# Patient Record
Sex: MALE | Race: WHITE | NOT HISPANIC OR LATINO | Employment: FULL TIME | URBAN - METROPOLITAN AREA
[De-identification: names, ages, dates, MRNs, and addresses within clinical notes are randomized per-mention and may not be internally consistent; named-entity substitution may affect disease eponyms.]

---

## 2022-06-02 ENCOUNTER — HOSPITAL ENCOUNTER (OUTPATIENT)
Facility: CLINIC | Age: 52
Setting detail: OBSERVATION
Discharge: HOME | End: 2022-06-03
Attending: EMERGENCY MEDICINE | Admitting: INTERNAL MEDICINE
Payer: COMMERCIAL

## 2022-06-02 ENCOUNTER — APPOINTMENT (OUTPATIENT)
Dept: GENERAL RADIOLOGY | Facility: CLINIC | Age: 52
End: 2022-06-02
Attending: EMERGENCY MEDICINE
Payer: COMMERCIAL

## 2022-06-02 ENCOUNTER — APPOINTMENT (OUTPATIENT)
Dept: CARDIOLOGY | Facility: CLINIC | Age: 52
End: 2022-06-02
Attending: INTERNAL MEDICINE
Payer: COMMERCIAL

## 2022-06-02 DIAGNOSIS — R94.31 ABNORMAL ELECTROCARDIOGRAM: ICD-10-CM

## 2022-06-02 DIAGNOSIS — J30.1 SEASONAL ALLERGIC RHINITIS DUE TO POLLEN: ICD-10-CM

## 2022-06-02 DIAGNOSIS — R79.89 ELEVATED TROPONIN: ICD-10-CM

## 2022-06-02 DIAGNOSIS — G44.209 TENSION HEADACHE: Primary | ICD-10-CM

## 2022-06-02 DIAGNOSIS — I10 HYPERTENSION, UNSPECIFIED TYPE: ICD-10-CM

## 2022-06-02 LAB
ANION GAP SERPL CALCULATED.3IONS-SCNC: 8 MMOL/L (ref 3–14)
ATRIAL RATE - MUSE: 62 BPM
BASOPHILS # BLD AUTO: 0.1 10E3/UL (ref 0–0.2)
BASOPHILS NFR BLD AUTO: 1 %
BUN SERPL-MCNC: 16 MG/DL (ref 7–30)
CALCIUM SERPL-MCNC: 9.2 MG/DL (ref 8.5–10.1)
CHLORIDE BLD-SCNC: 107 MMOL/L (ref 94–109)
CO2 SERPL-SCNC: 25 MMOL/L (ref 20–32)
CREAT SERPL-MCNC: 1.02 MG/DL (ref 0.66–1.25)
DIASTOLIC BLOOD PRESSURE - MUSE: NORMAL MMHG
EOSINOPHIL # BLD AUTO: 0.1 10E3/UL (ref 0–0.7)
EOSINOPHIL NFR BLD AUTO: 1 %
ERYTHROCYTE [DISTWIDTH] IN BLOOD BY AUTOMATED COUNT: 12.4 % (ref 10–15)
FLUAV RNA SPEC QL NAA+PROBE: NEGATIVE
FLUBV RNA RESP QL NAA+PROBE: NEGATIVE
GFR SERPL CREATININE-BSD FRML MDRD: 88 ML/MIN/1.73M2
GLUCOSE BLD-MCNC: 118 MG/DL (ref 70–99)
HCT VFR BLD AUTO: 45.3 % (ref 40–53)
HGB BLD-MCNC: 14.9 G/DL (ref 13.3–17.7)
HOLD SPECIMEN: NORMAL
IMM GRANULOCYTES # BLD: 0 10E3/UL
IMM GRANULOCYTES NFR BLD: 0 %
INTERPRETATION ECG - MUSE: NORMAL
LVEF ECHO: NORMAL
LYMPHOCYTES # BLD AUTO: 1.4 10E3/UL (ref 0.8–5.3)
LYMPHOCYTES NFR BLD AUTO: 15 %
MCH RBC QN AUTO: 29.4 PG (ref 26.5–33)
MCHC RBC AUTO-ENTMCNC: 32.9 G/DL (ref 31.5–36.5)
MCV RBC AUTO: 89 FL (ref 78–100)
MONOCYTES # BLD AUTO: 1.1 10E3/UL (ref 0–1.3)
MONOCYTES NFR BLD AUTO: 11 %
NEUTROPHILS # BLD AUTO: 6.6 10E3/UL (ref 1.6–8.3)
NEUTROPHILS NFR BLD AUTO: 72 %
NRBC # BLD AUTO: 0 10E3/UL
NRBC BLD AUTO-RTO: 0 /100
P AXIS - MUSE: 5 DEGREES
PLATELET # BLD AUTO: 307 10E3/UL (ref 150–450)
POTASSIUM BLD-SCNC: 3.5 MMOL/L (ref 3.4–5.3)
PR INTERVAL - MUSE: 132 MS
QRS DURATION - MUSE: 106 MS
QT - MUSE: 446 MS
QTC - MUSE: 452 MS
R AXIS - MUSE: 10 DEGREES
RBC # BLD AUTO: 5.07 10E6/UL (ref 4.4–5.9)
RSV RNA SPEC NAA+PROBE: NEGATIVE
SARS-COV-2 RNA RESP QL NAA+PROBE: NEGATIVE
SODIUM SERPL-SCNC: 140 MMOL/L (ref 133–144)
SYSTOLIC BLOOD PRESSURE - MUSE: NORMAL MMHG
T AXIS - MUSE: 148 DEGREES
TROPONIN I SERPL HS-MCNC: 110 NG/L
TROPONIN I SERPL HS-MCNC: 91 NG/L
TROPONIN I SERPL HS-MCNC: 93 NG/L
VENTRICULAR RATE- MUSE: 62 BPM
WBC # BLD AUTO: 9.2 10E3/UL (ref 4–11)

## 2022-06-02 PROCEDURE — 71046 X-RAY EXAM CHEST 2 VIEWS: CPT

## 2022-06-02 PROCEDURE — 36415 COLL VENOUS BLD VENIPUNCTURE: CPT | Performed by: INTERNAL MEDICINE

## 2022-06-02 PROCEDURE — G0378 HOSPITAL OBSERVATION PER HR: HCPCS

## 2022-06-02 PROCEDURE — C9803 HOPD COVID-19 SPEC COLLECT: HCPCS

## 2022-06-02 PROCEDURE — 87637 SARSCOV2&INF A&B&RSV AMP PRB: CPT | Performed by: EMERGENCY MEDICINE

## 2022-06-02 PROCEDURE — 250N000011 HC RX IP 250 OP 636: Performed by: HOSPITALIST

## 2022-06-02 PROCEDURE — 84484 ASSAY OF TROPONIN QUANT: CPT | Mod: 91 | Performed by: INTERNAL MEDICINE

## 2022-06-02 PROCEDURE — 93306 TTE W/DOPPLER COMPLETE: CPT

## 2022-06-02 PROCEDURE — 93005 ELECTROCARDIOGRAM TRACING: CPT

## 2022-06-02 PROCEDURE — 250N000013 HC RX MED GY IP 250 OP 250 PS 637: Performed by: INTERNAL MEDICINE

## 2022-06-02 PROCEDURE — 99285 EMERGENCY DEPT VISIT HI MDM: CPT | Mod: 25

## 2022-06-02 PROCEDURE — 80048 BASIC METABOLIC PNL TOTAL CA: CPT | Performed by: EMERGENCY MEDICINE

## 2022-06-02 PROCEDURE — 84484 ASSAY OF TROPONIN QUANT: CPT | Performed by: EMERGENCY MEDICINE

## 2022-06-02 PROCEDURE — 99220 PR INITIAL OBSERVATION CARE,LEVEL III: CPT | Performed by: INTERNAL MEDICINE

## 2022-06-02 PROCEDURE — 36415 COLL VENOUS BLD VENIPUNCTURE: CPT | Performed by: EMERGENCY MEDICINE

## 2022-06-02 PROCEDURE — 85025 COMPLETE CBC W/AUTO DIFF WBC: CPT | Performed by: EMERGENCY MEDICINE

## 2022-06-02 PROCEDURE — 93306 TTE W/DOPPLER COMPLETE: CPT | Mod: 26 | Performed by: INTERNAL MEDICINE

## 2022-06-02 PROCEDURE — 93005 ELECTROCARDIOGRAM TRACING: CPT | Mod: 76

## 2022-06-02 PROCEDURE — 96374 THER/PROPH/DIAG INJ IV PUSH: CPT

## 2022-06-02 PROCEDURE — 250N000013 HC RX MED GY IP 250 OP 250 PS 637: Performed by: EMERGENCY MEDICINE

## 2022-06-02 RX ORDER — ONDANSETRON 4 MG/1
4 TABLET, ORALLY DISINTEGRATING ORAL EVERY 6 HOURS PRN
Status: DISCONTINUED | OUTPATIENT
Start: 2022-06-02 | End: 2022-06-03 | Stop reason: HOSPADM

## 2022-06-02 RX ORDER — NITROGLYCERIN 0.4 MG/1
0.4 TABLET SUBLINGUAL EVERY 5 MIN PRN
Status: DISCONTINUED | OUTPATIENT
Start: 2022-06-02 | End: 2022-06-02

## 2022-06-02 RX ORDER — FLUTICASONE PROPIONATE 50 MCG
1 SPRAY, SUSPENSION (ML) NASAL DAILY
Status: DISCONTINUED | OUTPATIENT
Start: 2022-06-02 | End: 2022-06-03 | Stop reason: HOSPADM

## 2022-06-02 RX ORDER — ACETAMINOPHEN 500 MG
1000 TABLET ORAL EVERY 4 HOURS PRN
Status: DISCONTINUED | OUTPATIENT
Start: 2022-06-02 | End: 2022-06-02

## 2022-06-02 RX ORDER — ACETAMINOPHEN 325 MG/1
650 TABLET ORAL EVERY 4 HOURS PRN
Status: DISCONTINUED | OUTPATIENT
Start: 2022-06-02 | End: 2022-06-03 | Stop reason: HOSPADM

## 2022-06-02 RX ORDER — ONDANSETRON 2 MG/ML
4 INJECTION INTRAMUSCULAR; INTRAVENOUS EVERY 6 HOURS PRN
Status: DISCONTINUED | OUTPATIENT
Start: 2022-06-02 | End: 2022-06-03 | Stop reason: HOSPADM

## 2022-06-02 RX ORDER — NITROGLYCERIN 0.4 MG/1
0.4 TABLET SUBLINGUAL EVERY 5 MIN PRN
Status: DISCONTINUED | OUTPATIENT
Start: 2022-06-02 | End: 2022-06-03 | Stop reason: HOSPADM

## 2022-06-02 RX ORDER — HYDRALAZINE HYDROCHLORIDE 20 MG/ML
10 INJECTION INTRAMUSCULAR; INTRAVENOUS EVERY 6 HOURS PRN
Status: DISCONTINUED | OUTPATIENT
Start: 2022-06-02 | End: 2022-06-03 | Stop reason: HOSPADM

## 2022-06-02 RX ORDER — ASPIRIN 325 MG
325 TABLET ORAL ONCE
Status: COMPLETED | OUTPATIENT
Start: 2022-06-02 | End: 2022-06-02

## 2022-06-02 RX ORDER — LISINOPRIL 20 MG/1
20 TABLET ORAL DAILY
Status: DISCONTINUED | OUTPATIENT
Start: 2022-06-02 | End: 2022-06-03 | Stop reason: HOSPADM

## 2022-06-02 RX ORDER — OXYCODONE HYDROCHLORIDE 5 MG/1
5 TABLET ORAL ONCE
Status: COMPLETED | OUTPATIENT
Start: 2022-06-02 | End: 2022-06-02

## 2022-06-02 RX ADMIN — NITROGLYCERIN 0.4 MG: 0.4 TABLET SUBLINGUAL at 09:45

## 2022-06-02 RX ADMIN — ACETAMINOPHEN 1000 MG: 500 TABLET, FILM COATED ORAL at 11:17

## 2022-06-02 RX ADMIN — ACETAMINOPHEN 650 MG: 325 TABLET ORAL at 20:54

## 2022-06-02 RX ADMIN — OXYCODONE HYDROCHLORIDE 5 MG: 5 TABLET ORAL at 15:04

## 2022-06-02 RX ADMIN — LISINOPRIL 20 MG: 20 TABLET ORAL at 11:17

## 2022-06-02 RX ADMIN — HYDRALAZINE HYDROCHLORIDE 10 MG: 20 INJECTION INTRAMUSCULAR; INTRAVENOUS at 22:51

## 2022-06-02 RX ADMIN — ASPIRIN 325 MG ORAL TABLET 325 MG: 325 PILL ORAL at 09:49

## 2022-06-02 ASSESSMENT — ENCOUNTER SYMPTOMS
COUGH: 1
SINUS PRESSURE: 1
SHORTNESS OF BREATH: 0
RHINORRHEA: 0
TROUBLE SWALLOWING: 0

## 2022-06-02 NOTE — H&P
[facility]  Hospitalist Admission Note  Name: Dimitrios Marshall    MRN: [id number]  YOB: 1970    Age: 52 year old  Date of admission: 6/2/2022  Primary care provider: Darin Bennett    Chief Complaint: Sinus pressure    Assessment and Plan:   Hypertensive urgency  Elevated troponin  Incomplete RBBB: Incidentally found to have blood pressure above 200 systolic at urgent care where he was being seen for possible sinus infection.  Here blood pressure was 201/132.  He reports in the past blood pressure has been 130s/90s and they have been watching it, but he has not followed up with primary care for at least 2 years and since then he has been gaining some weight and has not been as healthy.  Has been taking as needed medications that could be causing acute rise in blood pressure including Sudafed 2 days ago, intermittent ibuprofen, and Afrin yesterday and again this morning.  He has no cardiac history.  He was a cross-country runner in high school and he reports having a stress test done then due to some chest pain and he remembers having an RBBB.  His EKG shows an incomplete RBBB, borderline LVH, and T wave inversions in leads I, II, aVL, and V3-V6.  High-sensitivity troponin I was checked and is slightly elevated at 110.  He denies any chest pain or shortness of breath now or with exertion as he regularly wraps lacrosse games.  Lifelong non-smoker.  No family history of heart disease.  Highly suspect troponin elevation is secondary to hypertensive urgency and this does not represent NSTEMI or acute coronary syndrome.  He did receive an aspirin in the ER and 1 dose of nitroglycerin which resulted in headache.  -Serial troponin to make sure no substantial rise  -Obtain TTE  -Telemetry overnight  -Start lisinopril 20 mg daily.  Goal blood pressure while here 130-160 systolic.  If ineffective next add amlodipine or hydrochlorothiazide  -BMP tomorrow  -Likely outpatient stress  echocardiogram if troponin's are flat and no reduced EF or WMA on TTE  -Close 1 week follow-up with new primary care doctor with BMP.  He reports setting up an appointment this past week    Likely acute viral sinusitis versus seasonal allergic rhinitis: Having right frontal sinus pressure just above his right eyebrow along with sinus congestion, some rhinorrhea, and mild nonproductive cough for the last 4 days.  Not having any fevers and does not have leukocytosis here.  Likely sinusitis secondary acute viral URI versus possible seasonal allergic rhinitis as he was sending his son's deck outside so was exposed to both dust and pollen.  Has been taking Flonase, Afrin, and Sudafed and symptoms seem to be improving.  Influenza A/B, RSV, COVID-19 PCR negative.  -Flonase   -Acetaminophen as needed  -No Sudafed, ibuprofen, or Afrin due to propensity to elevated blood pressure    Obesity: BMI 33.    DVT Prophylaxis: Low Risk/Ambulatory with no VTE prophylaxis indicated  Code Status: Full Code  FEN: Regular diet  Discharge Dispo: Home  Estimated Disch Date / # of Days until Disch: Admit to observation for serial troponin, echocardiogram, and BP med initiation.  Anticipate discharge tomorrow if blood pressure controlled and no significant rise in troponin or reduced EF/WMA on echocardiogram with plan for outpatient stress echo      History of Present Illness:  Dimitrios Marshall is a 52 year old male with PMH including seasonal allergic rhinitis, obesity, and borderline elevated blood pressure in the past who presented initially to urgent care for concerns for possible sinus infection.  His blood pressure was over 200 systolic at urgent care so he was referred to the ER.  For the past 4 days he has had some pressure and pain just above his right eyebrow so he thought he might have a sinus infection.  He has had some rhinorrhea, postnasal drip, dry cough, and nasal congestion as well.  Denies any fevers or chills.  Because of  this he went to urgent care to see if he needed any antibiotics.  He denies any history of hypertension, but he has not seen his primary care doctor in over 2 years and they were following his blood pressure as he usually is 130s/90s in clinic.  Has not checked his blood pressure since then.  Denies any recent headaches.  Has not had any exertional chest pain or shortness of breath when he exercises by resting lacrosse games.  He has put on weight and he actually called to set up a primary care doctor appointment this past week to try to get healthier.  Lifelong non-smoker.  No family history of heart disease.    History obtained from patient, medical record, and from Dr. Mcconnell in the emergency department.  Blood pressure 201/132, heart rate 79, temperature 97.8  F, oxygen 99% on room air.  CBC and BMP are within normal limits.  Blood glucose 118.  High-sensitivity troponin I slightly elevated at 110.  EKG shows NSR with borderline LVH and T wave inversions in leads I, II, aVL, and V3-V6.  Denies any chest pain or shortness of breath.  Received aspirin and nitroglycerin.  Now has a headache following nitroglycerin.  Low suspicion for ACS.  Admit to observation for blood pressure control, serial troponin, and echocardiogram.       Clinically Significant Risk Factors Present on Admission                                Past Medical History reviewed:  Past Medical History:   Diagnosis Date     Essential hypertension      Obesity      Seasonal allergic rhinitis      Past Surgical History reviewed:  No past surgical history on file.  Social History reviewed:  Social History     Tobacco Use     Smoking status: Never Smoker     Smokeless tobacco: Not on file   Substance Use Topics     Alcohol use: Not on file     Social History     Social History Narrative     Not on file     Family History reviewed:  Family History   Problem Relation Age of Onset     Neurologic Disorder Father         meningitis?     No Known Problems  Other         heart disease     Allergies:  No Known Allergies  Medications:  Prior to Admission medications    Medication Sig Last Dose Taking? Auth Provider   IBUPROFEN PO    Reported, Patient   Sudafed prn  Flonase prn  Afrin prn    Review of Systems:  A Comprehensive greater than 10 system review of systems was carried out.  Pertinent positives and negatives are noted above.  Otherwise negative.     Physical Exam:  Blood pressure (!) 159/109, pulse 63, temperature 97.8  F (36.6  C), temperature source Temporal, resp. rate 25, weight 118.5 kg (261 lb 3.9 oz), SpO2 98 %.  Wt Readings from Last 1 Encounters:   06/02/22 118.5 kg (261 lb 3.9 oz)     Exam:  Constitutional: Awake, NAD   Eyes: sclera white   HEENT: atraumatic, MMM, focal tenderness right frontal sinus  Respiratory: no respiratory distress, lungs cta bilaterally, no crackles or wheeze  Cardiovascular: RRR.  No murmur  GI: Obese, non-tender, not distended, bowel sounds present  Skin: no rash or lesions, acyanotic  Musculoskeletal/extremities: atraumatic, no major deformities. No lower extremity edema  Neurologic: A&O, speech clear, moves extremities equally and follows commands  Psychiatric: calm, cooperative, normal affect    Lab and imaging data personally reviewed:  Labs:  Recent Labs   Lab 06/02/22  0847   WBC 9.2   HGB 14.9   HCT 45.3   MCV 89        Recent Labs   Lab 06/02/22  0847      POTASSIUM 3.5   CHLORIDE 107   CO2 25   ANIONGAP 8   *   BUN 16   CR 1.02   GFRESTIMATED 88   SHAKIRA 9.2     Recent Labs   Lab 06/02/22  0847   TROPONINIS 110*       COVID19, influenza A/B, RSV PCR negative    EKG: NSR, incomplete RBBB, borderline LVH, T wave inversion in 1, 2, aVL, and V3-V6    Imaging:  Recent Results (from the past 24 hour(s))   XR Chest 2 Views    Narrative    CHEST TWO VIEWS  6/2/2022 8:58 AM     HISTORY: High blood pressure.    COMPARISON: None.      Impression    IMPRESSION: No acute cardiopulmonary disease.       Golden  MD Gregoria  Hospitalist  [facility]

## 2022-06-02 NOTE — PROGRESS NOTES
PRIMARY DIAGNOSIS: Hypertensive Urgency, Elevated Troponin  OUTPATIENT/OBSERVATION GOALS TO BE MET BEFORE DISCHARGE:  1. Ruled out acute coronary syndrome (negative or stable Troponin):  Trending serial troponin  2. Pain Status: Improved-controlled with oral pain medications.  3. Appropriate provocative testing performed: Yes  - Stress Test Procedure: Echo  - Interpretation of cardiac rhythm per telemetry tech: SR HR 86    4. Cleared by Consultants (if applicable):No  5. Return to near baseline physical activity: Yes  Discharge Planner Nurse   Safe discharge environment identified: Yes  Barriers to discharge: Yes    Pt alert and oriented x 4; BP's still elevated. Given one time dose of Oxycodone for headache rated 8/10. Echo done. Pending results and TTE. Continues on telemetry.  Pt independent in the room.     Please review provider order for any additional goals.   Nurse to notify provider when observation goals have been met and patient is ready for discharge.

## 2022-06-02 NOTE — ED PROVIDER NOTES
"  History   Chief Complaint:  Hypertension       HPI   Dimitrios Marshall is a 52 year old male who presents with for evaluation of after a high blood pressure reading this morning at urgent care. He was at urgent care this morning for sinus pressure above his right eyebrow for the last 3 days. He notes his blood pressure is normally in low the 130s over 90s. He has had a dry cough and congestion recently and took sudafed 2 days ago. 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  He does not smoke. Denies chest pain, rhinorrhea or trouble swallowing.    Review of Systems   HENT: Positive for sinus pressure. Negative for rhinorrhea and trouble swallowing.    Respiratory: Positive for cough. Negative for shortness of breath.    Cardiovascular: Negative for chest pain.   All other systems reviewed and are negative.      Allergies:  The patient has no known allergies.     Medications:  Ibuprofen  Mediplast    Past Medical History:     No past medical history on file.    Social History:  The patient presents to the ED alone.    Physical Exam     Patient Vitals for the past 24 hrs:   BP Temp Temp src Pulse Resp SpO2 Height Weight   06/02/22 1431 (!) 174/101 -- -- -- -- -- -- --   06/02/22 1411 (!) 168/118 -- -- -- -- -- -- --   06/02/22 1209 (!) 170/115 97.9  F (36.6  C) Oral 69 20 96 % 1.88 m (6' 2\") 117.4 kg (258 lb 14.4 oz)   06/02/22 1130 (!) 172/116 -- -- 67 21 -- -- --   06/02/22 1120 (!) 172/112 -- -- 81 11 -- -- --   06/02/22 1110 (!) 172/112 -- -- 67 13 -- -- --   06/02/22 1100 (!) 159/109 -- -- 63 25 -- -- --   06/02/22 1050 (!) 169/120 -- -- 67 22 -- -- --   06/02/22 1020 (!) 163/116 -- -- 66 20 -- -- --   06/02/22 1010 (!) 161/106 -- -- 66 17 -- -- --   06/02/22 1000 (!) 168/115 -- -- 70 18 -- -- --   06/02/22 0950 (!) 178/120 -- -- 96 20 -- -- --   06/02/22 0940 (!) 195/134 -- -- -- -- -- -- --   06/02/22 0850 (!) 201/132 -- -- 75 20 98 % -- --   06/02/22 0845 (!) 201/132 -- -- 79 13 99 % " -- --   06/02/22 0830 (!) 196/118 -- -- -- -- -- -- --   06/02/22 0825 (!) 201/130 97.8  F (36.6  C) Temporal 83 18 99 % -- 118.5 kg (261 lb 3.9 oz)       Physical Exam   Constitutional: Patient is well appearing. No distress.  No complaints  Head: Atraumatic.  Frontal sinus tap positive.  Mouth/Throat: Oropharynx is clear and moist. No oropharyngeal exudate.  Eyes: Conjunctivae and EOM are normal. No scleral icterus.  Neck: Normal range of motion. Neck supple.   Cardiovascular: Normal rate, regular rhythm, normal heart sounds and intact distal perfusion.   Pulmonary/Chest: Breath sounds normal. No respiratory distress.  Abdominal: Soft. Bowel sounds are normal. No distension. No tenderness. No rebound or guarding.   Musculoskeletal: Normal range of motion. No edema or tenderness.   Neurological: Alert and orientated to person, place, and time. No observable focal neuro deficit  Skin: Warm and dry. No rash noted. Not diaphoretic.       Emergency Department Course   ECG  ECG taken at 0833, ECG read at 0843  Normal sinus rhythm. Incomplete right bundle branch block. ST & T wave abnormality, consider inferolateral ischemia. Abnormal ECG.   No prior ECG for comparison.  Rate 68 bpm. SD interval 124 ms. QRS duration 110 ms. QT/QTc 416/442 ms. P-R-T axes 4 12 147.     ECG #2  ECG taken at 1110, ECG read at 1114  Normal sinus rhythm. Incomplete right bundle branch block. ST & T wave abnormality, consider lateral ischemia. Abnormal ECG.   Rate 62 bpm. SD interval 132 ms. QRS duration 106 ms. QT/QTc 446/452 ms. P-R-T axes 5 10 148.     Imaging:  Echocardiogram Complete   Final Result      XR Chest 2 Views   Final Result   IMPRESSION: No acute cardiopulmonary disease.      BRIGIDA HARTMAN MD            SYSTEM ID:  [id number]        Report per radiology    Laboratory:  Labs Ordered and Resulted from Time of ED Arrival to Time of ED Departure   BASIC METABOLIC PANEL - Abnormal       Result Value    Sodium 140      Potassium 3.5       Chloride 107      Carbon Dioxide (CO2) 25      Anion Gap 8      Urea Nitrogen 16      Creatinine 1.02      Calcium 9.2      Glucose 118 (*)     GFR Estimate 88     TROPONIN I - Abnormal    Troponin I High Sensitivity 110 (*)    INFLUENZA A/B & SARS-COV2 PCR MULTIPLEX - Normal    Influenza A PCR Negative      Influenza B PCR Negative      RSV PCR Negative      SARS CoV2 PCR Negative     CBC WITH PLATELETS AND DIFFERENTIAL    WBC Count 9.2      RBC Count 5.07      Hemoglobin 14.9      Hematocrit 45.3      MCV 89      MCH 29.4      MCHC 32.9      RDW 12.4      Platelet Count 307      % Neutrophils 72      % Lymphocytes 15      % Monocytes 11      % Eosinophils 1      % Basophils 1      % Immature Granulocytes 0      NRBCs per 100 WBC 0      Absolute Neutrophils 6.6      Absolute Lymphocytes 1.4      Absolute Monocytes 1.1      Absolute Eosinophils 0.1      Absolute Basophils 0.1      Absolute Immature Granulocytes 0.0      Absolute NRBCs 0.0          Procedures  None    Emergency Department Course:       Reviewed:  I reviewed nursing notes, vitals, past medical history and Care Everywhere    Assessments:  0904 I obtained history and examined the patient as noted above.   0940 I rechecked the patient and explained findings.    Consults:  0958 I spoke to Dr. Kinney, hospitalist, who accepts the patient.    Interventions:  0945 Nitrostat 0.4 mg Sublingual   0949 Aspirin 325 mg PO  1117 Zestril 20 mg PO  1117 Tylenol 1,000 mg PO    Disposition:  The patient was admitted to the hospital under the care of Dr. Kinney.     Impression & Plan     CMS Diagnoses: None      Medical Decision Making:  No Hx of HTN now with HTN and sign of end organ dysfxn with slight elev in trop and abnormal appearing ekg same x2 with no comparison in history.  Responded well to NTG and Gregoria said hold on starting anti-HTN he will start lisinopril inpt.  Admit for further workup and mgmt.     Diagnosis:    ICD-10-CM    1. Abnormal electrocardiogram   R94.31    2. Elevated troponin  R77.8    3. Hypertension, unspecified type  I10        Scribe Disclosure:  I, Darshana Downing, am serving as a scribe at 8:55 AM on 6/2/2022 to document services personally performed by Bradley Mcconnell MD based on my observations and the provider's statements to me.            Bradley Mcconnell MD  06/02/22 1532

## 2022-06-02 NOTE — ED NOTES
[facility]  ED Nurse Handoff Report    Dimitrios Marshall is a 52 year old male   ED Chief complaint: Hypertension  . ED Diagnosis:   Final diagnoses:   Abnormal electrocardiogram   Elevated troponin   Hypertension, unspecified type     Allergies: No Known Allergies    Code Status: Full Code  Activity level - Baseline/Home:  Independent. Activity Level - Current:   Independent. Lift room needed: No. Bariatric: No   Needed: No   Isolation: No. Infection: Not Applicable.     Vital Signs:   Vitals:    06/02/22 1020 06/02/22 1050 06/02/22 1100 06/02/22 1110   BP: (!) 163/116 (!) 169/120 (!) 159/109 (!) 172/112   Pulse: 66 67 63 67   Resp: 20 22 25 13   Temp:       TempSrc:       SpO2:       Weight:           Cardiac Rhythm:  ,      Pain level:    Patient confused: No. Patient Falls Risk: No.   Elimination Status: Has voided   Patient Report - Initial Complaint: hypertension. Focused Assessment: pt showed to urgent care for possible sinus infection, sent to ER for hypertension. Pt denies CP/SOB     Tests Performed: see results. Abnormal Results: see results.  Labs Ordered and Resulted from Time of ED Arrival to Time of ED Departure   BASIC METABOLIC PANEL - Abnormal       Result Value    Sodium 140      Potassium 3.5      Chloride 107      Carbon Dioxide (CO2) 25      Anion Gap 8      Urea Nitrogen 16      Creatinine 1.02      Calcium 9.2      Glucose 118 (*)     GFR Estimate 88     TROPONIN I - Abnormal    Troponin I High Sensitivity 110 (*)    INFLUENZA A/B & SARS-COV2 PCR MULTIPLEX - Normal    Influenza A PCR Negative      Influenza B PCR Negative      RSV PCR Negative      SARS CoV2 PCR Negative     CBC WITH PLATELETS AND DIFFERENTIAL    WBC Count 9.2      RBC Count 5.07      Hemoglobin 14.9      Hematocrit 45.3      MCV 89      MCH 29.4      MCHC 32.9      RDW 12.4      Platelet Count 307      % Neutrophils 72      % Lymphocytes 15      % Monocytes 11      % Eosinophils 1      % Basophils 1       % Immature Granulocytes 0      NRBCs per 100 WBC 0      Absolute Neutrophils 6.6      Absolute Lymphocytes 1.4      Absolute Monocytes 1.1      Absolute Eosinophils 0.1      Absolute Basophils 0.1      Absolute Immature Granulocytes 0.0      Absolute NRBCs 0.0     TROPONIN I     XR Chest 2 Views   Preliminary Result   IMPRESSION: No acute cardiopulmonary disease.         Treatments provided: see MAR  Family Comments: pt able to update family per self  OBS brochure/video discussed/provided to patient:  Yes  ED Medications:   Medications   nitroGLYcerin (NITROSTAT) sublingual tablet 0.4 mg (0.4 mg Sublingual Given 6/2/22 0992)   lisinopril (ZESTRIL) tablet 20 mg (20 mg Oral Given 6/2/22 1117)   acetaminophen (TYLENOL) tablet 1,000 mg (1,000 mg Oral Given 6/2/22 1117)   aspirin (ASA) tablet 325 mg (325 mg Oral Given 6/2/22 8686)     Drips infusing:  No  For the majority of the shift, the patient's behavior Green. Interventions performed were n/a.    Sepsis treatment initiated: No     Patient tested for COVID 19 prior to admission: YES    ED Nurse Name/Phone Number: Flower Crabtree RN,   11:19 AM    RECEIVING UNIT ED HANDOFF REVIEW    Above ED Nurse Handoff Report was reviewed: Yes  Reviewed by: Chanel Rivero RN on June 2, 2022 at 11:35 AM

## 2022-06-02 NOTE — ED TRIAGE NOTES
Pt was at clinic for sinus infection, told BP was elevated and needed to come to ED. Pt denies any vision changes, HA, or any other sx. No hx HTN. ABCs intact.

## 2022-06-02 NOTE — PHARMACY-ADMISSION MEDICATION HISTORY
Admission medication history interview status for this patient is complete. See [facility] admission navigator for allergy information, prior to admission medications and immunization status.     Medication history interview done, indicate source(s): Patient  Medication history resources (including written lists, pill bottles, clinic record):None  Pharmacy: n/a    Changes made to PTA medication list:  Added: none  Changed: none  Reported as Not Taking: none  Removed: none    Actions taken by pharmacist (provider contacted, etc):None     Additional medication history information:None    Medication reconciliation/reorder completed by provider prior to medication history?  Y   (Y/N)         Prior to Admission medications    Medication Sig Last Dose Taking? Auth Provider   ibuprofen (ADVIL/MOTRIN) 200 MG tablet Take 200-400 mg by mouth every 6 hours as needed Past Week at Unknown time Yes Reported, Patient

## 2022-06-02 NOTE — PROGRESS NOTES
ROOM # 213-1    Living Situation (if not independent, order SW consult): Home  Facility name:  : [facility]    Activity level at baseline: Independent  Activity level on admit: Independent       Patient registered to observation; given Patient Bill of Rights; given the opportunity to ask questions about observation status and their plan of care.  Patient has been oriented to the observation room, bathroom and call light is in place.    Discussed discharge goals and expectations with patient/family.

## 2022-06-03 VITALS
SYSTOLIC BLOOD PRESSURE: 179 MMHG | RESPIRATION RATE: 18 BRPM | HEART RATE: 78 BPM | TEMPERATURE: 98.1 F | BODY MASS INDEX: 33.22 KG/M2 | WEIGHT: 258.9 LBS | OXYGEN SATURATION: 97 % | DIASTOLIC BLOOD PRESSURE: 74 MMHG | HEIGHT: 74 IN

## 2022-06-03 LAB
ANION GAP SERPL CALCULATED.3IONS-SCNC: 3 MMOL/L (ref 3–14)
ATRIAL RATE - MUSE: 68 BPM
BUN SERPL-MCNC: 14 MG/DL (ref 7–30)
CALCIUM SERPL-MCNC: 9.2 MG/DL (ref 8.5–10.1)
CHLORIDE BLD-SCNC: 110 MMOL/L (ref 94–109)
CO2 SERPL-SCNC: 25 MMOL/L (ref 20–32)
CREAT SERPL-MCNC: 0.97 MG/DL (ref 0.66–1.25)
DIASTOLIC BLOOD PRESSURE - MUSE: NORMAL MMHG
GFR SERPL CREATININE-BSD FRML MDRD: >90 ML/MIN/1.73M2
GLUCOSE BLD-MCNC: 109 MG/DL (ref 70–99)
INTERPRETATION ECG - MUSE: NORMAL
P AXIS - MUSE: 4 DEGREES
POTASSIUM BLD-SCNC: 3.4 MMOL/L (ref 3.4–5.3)
PR INTERVAL - MUSE: 124 MS
QRS DURATION - MUSE: 110 MS
QT - MUSE: 416 MS
QTC - MUSE: 442 MS
R AXIS - MUSE: 12 DEGREES
SODIUM SERPL-SCNC: 138 MMOL/L (ref 133–144)
SYSTOLIC BLOOD PRESSURE - MUSE: NORMAL MMHG
T AXIS - MUSE: 147 DEGREES
VENTRICULAR RATE- MUSE: 68 BPM

## 2022-06-03 PROCEDURE — 250N000013 HC RX MED GY IP 250 OP 250 PS 637: Performed by: INTERNAL MEDICINE

## 2022-06-03 PROCEDURE — 250N000011 HC RX IP 250 OP 636: Performed by: HOSPITALIST

## 2022-06-03 PROCEDURE — 250N000013 HC RX MED GY IP 250 OP 250 PS 637: Performed by: HOSPITALIST

## 2022-06-03 PROCEDURE — 96376 TX/PRO/DX INJ SAME DRUG ADON: CPT

## 2022-06-03 PROCEDURE — 80048 BASIC METABOLIC PNL TOTAL CA: CPT | Performed by: INTERNAL MEDICINE

## 2022-06-03 PROCEDURE — G0378 HOSPITAL OBSERVATION PER HR: HCPCS

## 2022-06-03 PROCEDURE — 99217 PR OBSERVATION CARE DISCHARGE: CPT | Performed by: HOSPITALIST

## 2022-06-03 PROCEDURE — 36415 COLL VENOUS BLD VENIPUNCTURE: CPT | Performed by: INTERNAL MEDICINE

## 2022-06-03 RX ORDER — ACETAMINOPHEN 325 MG/1
650 TABLET ORAL EVERY 4 HOURS PRN
COMMUNITY
Start: 2022-06-03

## 2022-06-03 RX ORDER — CETIRIZINE HYDROCHLORIDE 5 MG/1
5 TABLET ORAL DAILY
COMMUNITY
Start: 2022-06-03

## 2022-06-03 RX ORDER — CETIRIZINE HYDROCHLORIDE 5 MG/1
5 TABLET ORAL DAILY
Status: DISCONTINUED | OUTPATIENT
Start: 2022-06-03 | End: 2022-06-03 | Stop reason: HOSPADM

## 2022-06-03 RX ORDER — HYDROCHLOROTHIAZIDE 25 MG/1
25 TABLET ORAL DAILY
Status: DISCONTINUED | OUTPATIENT
Start: 2022-06-03 | End: 2022-06-03 | Stop reason: HOSPADM

## 2022-06-03 RX ORDER — ACETAMINOPHEN 325 MG/1
975 TABLET ORAL ONCE
Status: COMPLETED | OUTPATIENT
Start: 2022-06-03 | End: 2022-06-03

## 2022-06-03 RX ORDER — LISINOPRIL AND HYDROCHLOROTHIAZIDE 20; 25 MG/1; MG/1
1 TABLET ORAL DAILY
Qty: 30 TABLET | Refills: 1 | Status: SHIPPED | OUTPATIENT
Start: 2022-06-03

## 2022-06-03 RX ADMIN — ACETAMINOPHEN 650 MG: 325 TABLET ORAL at 06:51

## 2022-06-03 RX ADMIN — LISINOPRIL 20 MG: 20 TABLET ORAL at 08:31

## 2022-06-03 RX ADMIN — CETIRIZINE HYDROCHLORIDE 5 MG: 5 TABLET ORAL at 09:21

## 2022-06-03 RX ADMIN — ACETAMINOPHEN 975 MG: 325 TABLET ORAL at 09:21

## 2022-06-03 RX ADMIN — HYDROCHLOROTHIAZIDE 25 MG: 25 TABLET ORAL at 08:31

## 2022-06-03 RX ADMIN — HYDRALAZINE HYDROCHLORIDE 10 MG: 20 INJECTION INTRAMUSCULAR; INTRAVENOUS at 06:03

## 2022-06-03 NOTE — PLAN OF CARE
Patient's After Visit Summary was reviewed with patient.    Patient verbalized understanding of After Visit Summary, recommended follow up and was given an opportunity to ask questions.   Discharge medications sent home with patient/family: E-scribed to patient's pharmacy of choice.  Discharged with self.    IV access discontinued. All personal belongings returned. Escorted to exit with staff.    OBSERVATION patient END time: 9:55 AM

## 2022-06-03 NOTE — PROVIDER NOTIFICATION
Patient reporting he hasn't been able to sleep all night. Sinus are really bothering him. Requesting Afrin nasal spray or any suggestion to help give him relief.     On-call hospitalist paged and updated.

## 2022-06-03 NOTE — PROVIDER NOTIFICATION
Patient Blood pressure 178/114. only Complains of mild headache and given PRN Tylenol. No PRN for HTN. Started low dose Lisinopril. Patient requesting Nasal Saline for sinus also.    On-call Hospitalist paged and updated.     Orders received for PRN IV hydralazine 10 mg. Blood pressure is improved from 178/110 to 157/95.     Will continue to monitor.

## 2022-06-03 NOTE — PROVIDER NOTIFICATION
Blood Pressure still elevated after IV hydralazine at 0603.  Complaining mild headache and nasal congestion with sinus concerns.     0510 /109  0651- /103  0713 /116

## 2022-06-03 NOTE — PLAN OF CARE
"PRIMARY DIAGNOSIS: Hypertensive urgency/Allergic rhinitis     OUTPATIENT/OBSERVATION GOALS TO BE MET BEFORE DISCHARGE:  1. Pain Status: Improved-controlled with oral pain medications.    2. Return to near baseline physical activity: Yes    3. Cleared for discharge by consultants (if involved): N/A    Discharge Planner Nurse   Safe discharge environment identified: Yes  Barriers to discharge: No       Entered by: Tara Farooq RN 06/03/2022   BP (!) 179/74 (BP Location: Right arm, Patient Position: Supine)   Pulse 78   Temp 98.1  F (36.7  C) (Oral)   Resp 18   Ht 1.88 m (6' 2\")   Wt 117.4 kg (258 lb 14.4 oz)   SpO2 97%   BMI 33.24 kg/m    A&Ox4. O2 sats stable on RA. Up independently. Ambulated in hallways, tolerated well. C/o headache pain, managed w/ PRN Tylenol. BS active x4, tolerating regular diet, passing flatus. Voiding in adequate amt's. SL. Plan to discharge to home today.  Please review provider order for any additional goals.   Nurse to notify provider when observation goals have been met and patient is ready for discharge.  "

## 2022-06-03 NOTE — PLAN OF CARE
PRIMARY DIAGNOSIS: Hypertensive Urgency, Elevated Troponin  OUTPATIENT/OBSERVATION GOALS TO BE MET BEFORE DISCHARGE:  1. Ruled out acute coronary syndrome (negative or stable Troponin):  Yes  2. Pain Status: Mild headache managed with PRN Tylenol.   3. Appropriate provocative testing performed: Yes  - Stress Test Procedure: Echo  - Interpretation of cardiac rhythm per telemetry tech: SR HR 78.    4. Cleared by Consultants (if applicable):Yes  5. Return to near baseline physical activity: Yes   Temp: 98.2  F (36.8  C) Temp src: Oral BP: (!) 157/95 Pulse: 85   Resp: 18 SpO2: 99 % O2 Device: None (Room air)       Patient is Alert and Oriented x4. Complained of mild headache and given tylenol with effective results. Patient is Saline locked.  Pt is a Regular diet. He is independent with no assistive devices . Will continue to monitor.     Discharge Planner Nurse   Safe discharge environment identified: Yes  Barriers to discharge: Yes       Entered by: Shannon Bertrand RN 06/03/2022 1:36 AM     Please review provider order for any additional goals.   Nurse to notify provider when observation goals have been met and patient is ready for discharge.

## 2022-06-03 NOTE — PLAN OF CARE
PRIMARY DIAGNOSIS: Hypertensive Urgency, Elevated Troponin  OUTPATIENT/OBSERVATION GOALS TO BE MET BEFORE DISCHARGE:  1. Ruled out acute coronary syndrome (negative or stable Troponin):  Yes  2. Pain Status: Mild headache managed with PRN Tylenol.   3. Appropriate provocative testing performed: Yes  - Stress Test Procedure: Echo  - Interpretation of cardiac rhythm per telemetry tech: SR HR 78.    4. Cleared by Consultants (if applicable):Yes  5. Return to near baseline physical activity: Yes   Temp: 98.1  F (36.7  C) Temp src: Oral BP: (!) 174/103 Pulse: 69   Resp: 18 SpO2: 98 % O2 Device: None (Room air)       Patient is Alert and Oriented x4. Complained of mild headache and given tylenol with effective results. Patient is Saline locked.  Pt is a Regular diet. He is independent with no assistive devices . BP is elevated again this morning and given PRN hydralazine. Plan is for a Possible TTE this morning. Will Continue to monitor.     Discharge Planner Nurse   Safe discharge environment identified: Yes  Barriers to discharge: Yes       Entered by: Shannon Bertrand RN 06/03/2022 7:10 AM     Please review provider order for any additional goals.   Nurse to notify provider when observation goals have been met and patient is ready for discharge.

## 2022-06-03 NOTE — PLAN OF CARE
PRIMARY DIAGNOSIS: Hypertensive Urgency, Elevated Troponin  OUTPATIENT/OBSERVATION GOALS TO BE MET BEFORE DISCHARGE:  1. Ruled out acute coronary syndrome (negative or stable Troponin):  Yes  2. Pain Status: Mild headache managed with PRN Tylenol.   3. Appropriate provocative testing performed: Yes  - Stress Test Procedure: Echo  - Interpretation of cardiac rhythm per telemetry tech: SR HR 78.    4. Cleared by Consultants (if applicable):Yes  5. Return to near baseline physical activity: Yes  Patient is Alert and Oriented x4. Complained of mild headache and given tylenol with effective results. Patient is Saline locked.  Pt is a Regular diet. He is independent with no assistive devices . Will continue to monitor.     Discharge Planner Nurse   Safe discharge environment identified: Yes  Barriers to discharge: Yes       Entered by: Shannon Bertrand RN 06/03/2022 1:13 AM     Please review provider order for any additional goals.   Nurse to notify provider when observation goals have been met and patient is ready for discharge.

## 2022-06-03 NOTE — DISCHARGE SUMMARY
[facility]  Hospitalist Discharge Summary      Date of Admission:  6/2/2022  Date of Discharge:  6/3/2022  Discharging Provider: Kali Mustafa MD  Discharge Service: Hospitalist Service    Discharge Diagnoses   Hypertensive urgency, allergic rhinitis    Follow-ups Needed After Discharge   Follow-up Appointments     Follow-up and recommended labs and tests       Follow up with primary care provider, Darin Bennett DPM, within 7   days for hospital follow- up.  The following labs/tests are recommended:   follow-up on blood pressure and review medications. Check chem 7 in 1 week   after starting new BP meds.  Discuss possible need for sleep study.           Unresulted Labs Ordered in the Past 30 Days of this Admission     No orders found for last 31 day(s).      These results will be followed up by NA    Discharge Disposition   Discharged to home  Condition at discharge: Stable    Hospital Course   Dimitrios Marshall is a 52 year old male with PMH including seasonal allergic rhinitis, obesity, and borderline elevated blood pressure in the past who presented initially to urgent care for concerns for possible sinus infection.  His blood pressure was over 200 systolic at urgent care so he was referred to the ER.  For the past 4 days he has had some pressure and pain just above his right eyebrow so he thought he might have a sinus infection.  He has had some rhinorrhea, postnasal drip, dry cough, and nasal congestion as well.  Denies any fevers or chills.  Because of this he went to urgent care to see if he needed any antibiotics.  He denies any history of hypertension, but he has not seen his primary care doctor in over 2 years and they were following his blood pressure as he usually is 130s/90s in clinic.  Has not checked his blood pressure since then.  Denies any recent headaches.  Has not had any exertional chest pain or shortness of breath when he exercises by resting lacrosse games.  He  has put on weight and he actually called to set up a primary care doctor appointment this past week to try to get healthier.  Lifelong non-smoker.  No family history of heart disease.     History obtained from patient, medical record, and from Dr. Mcconnell in the emergency department.  Blood pressure 201/132, heart rate 79, temperature 97.8  F, oxygen 99% on room air.  CBC and BMP are within normal limits.  Blood glucose 118.  High-sensitivity troponin I slightly elevated at 110.  EKG shows NSR with borderline LVH and T wave inversions in leads I, II, aVL, and V3-V6.  Denies any chest pain or shortness of breath.  Received aspirin and nitroglycerin.  Now has a headache following nitroglycerin.  Low suspicion for ACS.  Admit to observation for blood pressure control, serial troponin, and echocardiogram.     Patient today is doing well.  He still has congestion related to his seasonal allergies.  No chest pain or shortness of breath.  His blood pressure is still elevated, but improved.  I have added hydrochlorothiazide.  At this point I think he is able to discharge home.  His echocardiogram was not concerning.  He is actually running multiple times a week now that lacrosse season has started.  He has never had chest pain with exertion.  I do not think we need to schedule a stress test at this point.  He has an appointment with a new primary care doctor next Thursday.  He is going to buy a blood pressure cuff and record his blood pressures to take to this appointment so that his physician can adjust his medications.  I will send him home with Zestoretic.  I have also indicated that he needs to read the labels of all the cold and allergy medications to be sure he takes medications that do not contribute to hypertension.    Consultations This Hospital Stay   None    Code Status   Full Code    Time Spent on this Encounter   I, Kali Mustafa MD, personally saw the patient today and spent greater than 30 minutes  discharging this patient.       Kali Mustafa MD  [facility] OBSERVATION DEPT  [address]  [facility] [address]  Phone: [phone]  ______________________________________________________________________    Physical Exam   Vital Signs: Temp: 98.1  F (36.7  C) Temp src: Oral BP: (!) 184/116 Pulse: 85   Resp: 18 SpO2: 98 % O2 Device: None (Room air)    Weight: 258 lbs 14.4 oz  Constitutional: awake, alert, cooperative, no apparent distress, and appears stated age  Eyes: Lids and lashes normal, pupils equal, round and reactive to light, extra ocular muscles intact, sclera clear, conjunctiva normal  ENT: Normocephalic, without obvious abnormality, atraumatic, sinuses nontender on palpation, external ears without lesions, oral pharynx with moist mucous membranes, tonsils without erythema or exudates, gums normal and good dentition.  Respiratory: No increased work of breathing, good air exchange, clear to auscultation bilaterally, no crackles or wheezing  Cardiovascular: Normal apical impulse, regular rate and rhythm, normal S1 and S2, no S3 or S4, and no murmur noted  GI: No scars, normal bowel sounds, soft, non-distended, non-tender, no masses palpated, no hepatosplenomegally  Skin: no bruising or bleeding  Musculoskeletal: no lower extremity pitting edema present       Primary Care Physician   Darin Bennett DPM    Discharge Orders      Reason for your hospital stay    Hypertensive urgency. Allergic rhinitis     Follow-up and recommended labs and tests     Follow up with primary care provider, Darin Bennett DPM, within 7 days for hospital follow- up.  The following labs/tests are recommended: follow-up on blood pressure and review medications. Check chem 7 in 1 week after starting new BP meds.  Discuss possible need for sleep study.     Activity    Your activity upon discharge: activity as tolerated     Monitor and record    blood pressure daily (take theses records to your  primary care physician)     Discharge Instructions    Please avoid over the counter medications that could cause hypertension (consult the labels)     Diet    Follow this diet upon discharge: Orders Placed This Encounter      Regular Diet Adult       Significant Results and Procedures   Most Recent 3 CBC's:Recent Labs   Lab Test 22  0847   WBC 9.2   HGB 14.9   MCV 89        Most Recent 3 BMP's:Recent Labs   Lab Test 22  0547 22  0847    140   POTASSIUM 3.4 3.5   CHLORIDE 110* 107   CO2 25 25   BUN 14 16   CR 0.97 1.02   ANIONGAP 3 8   SHAKIRA 9.2 9.2   * 118*     Most Recent 2 LFT's:No lab results found.,   Results for orders placed or performed during the hospital encounter of 22   XR Chest 2 Views    Narrative    CHEST TWO VIEWS  2022 8:58 AM     HISTORY: High blood pressure.    COMPARISON: None.      Impression    IMPRESSION: No acute cardiopulmonary disease.    BRIGIDA HARTMAN MD         SYSTEM ID:  [id number]   Echocardiogram Complete     Value    LVEF  55-60%    Narrative    [id number]  [facility]  [id number]  [id number]^RICH^BOBY^ALF     [facility]  Echocardiography Laboratory  [address] [place] [address]     Name: REGULO CASTILLO  MRN: [id number]  : 1970  Study Date: 2022 02:30 PM  Age: 52 yrs  Gender: Male  Patient Location: [facility]  Reason For Study: Hypertension (HTN), BBB  Ordering Physician: BOBY VILLANUEVA  Performed By: Reyes Joseph RDCS     BSA: 2.4 m2  Height: 74 in  Weight: 258 lb  HR: 76  BP: 170/115 mmHg  ______________________________________________________________________________  Procedure  Complete Portable Echo Adult. Complete Echo Adult.  ______________________________________________________________________________  Interpretation Summary     1. Normal biventricular size and function. Left ventricular ejection fraction  of 60-65%. Mild LVH.  2. No hemodynamically significant valvular disease.  3. Normal  sized ascending aorta (3.7 cms).  No prior study for comparison. Technically adequate study.  ______________________________________________________________________________  Left Ventricle  The left ventricle is normal in size. There is mild concentric left  ventricular hypertrophy. Left ventricular systolic function is normal. The  visual ejection fraction is 55-60%. Diastolic Doppler findings (E/E' ratio  and/or other parameters) suggest left ventricular filling pressures are  increased. No regional wall motion abnormalities noted.     Right Ventricle  The right ventricle is normal in size and function.     Atria  The left atrium is moderately dilated. Right atrial size is normal.     Mitral Valve  The mitral valve leaflets appear normal. There is no evidence of stenosis,  fluttering, or prolapse. There is trace mitral regurgitation.     Tricuspid Valve  Normal tricuspid valve. There is trace tricuspid regurgitation. Right  ventricular systolic pressure could not be approximated due to inadequate  tricuspid regurgitation.     Aortic Valve  Normal tricuspid aortic valve. There is trace aortic regurgitation. No aortic  stenosis is present.     Pulmonic Valve  The pulmonic valve is not well visualized. There is trace pulmonic valvular  regurgitation.     Vessels  Normal size aorta. Normal size ascending aorta. IVC diameter <2.1 cm  collapsing >50% with sniff suggests a normal RA pressure of 3 mmHg.     Pericardium  There is no pericardial effusion.     Rhythm  Sinus rhythm was noted.  ______________________________________________________________________________  MMode/2D Measurements & Calculations     IVSd: 1.6 cm  LVIDd: 5.3 cm  LVIDs: 2.5 cm  LVPWd: 1.2 cm  FS: 52.0 %  LV mass(C)d: 304.8 grams  LV mass(C)dI: 125.9 grams/m2  Ao root diam: 3.2 cm  LA dimension: 4.8 cm  asc Aorta Diam: 3.7 cm  LA/Ao: 1.5  LA Volume (BP): 107.0 ml  LA Volume Index (BP): 44.2 ml/m2  RWT: 0.44     Doppler Measurements &  Calculations  MV E max edinson: 91.9 cm/sec  MV A max edinson: 58.0 cm/sec  MV E/A: 1.6  MV dec time: 0.23 sec  Ao V2 max: 215.0 cm/sec  Ao max P.0 mmHg  LV V1 max P.0 mmHg  LV V1 max: 166.0 cm/sec  PA acc time: 0.13 sec  AV Edinson Ratio (DI): 0.77  E/E' avg: 15.4  Lateral E/e': 14.9  Medial E/e': 16.0     ______________________________________________________________________________  Report approved by: Javon Ernst 2022 03:32 PM               Discharge Medications   Current Discharge Medication List      START taking these medications    Details   acetaminophen (TYLENOL) 325 MG tablet Take 2 tablets (650 mg) by mouth every 4 hours as needed for headaches, mild pain or fever    Associated Diagnoses: Tension headache      cetirizine (ZYRTEC) 5 MG tablet Take 1 tablet (5 mg) by mouth daily    Associated Diagnoses: Seasonal allergic rhinitis due to pollen      lisinopril-hydrochlorothiazide (ZESTORETIC) 20-25 MG tablet Take 1 tablet by mouth daily  Qty: 30 tablet, Refills: 1    Associated Diagnoses: Hypertension, unspecified type         CONTINUE these medications which have NOT CHANGED    Details   ibuprofen (ADVIL/MOTRIN) 200 MG tablet Take 200-400 mg by mouth every 6 hours as needed           Allergies   No Known Allergies